# Patient Record
Sex: MALE | Race: WHITE | Employment: FULL TIME
[De-identification: names, ages, dates, MRNs, and addresses within clinical notes are randomized per-mention and may not be internally consistent; named-entity substitution may affect disease eponyms.]

---

## 2017-07-05 PROBLEM — L83 ACANTHOSIS NIGRICANS: Status: ACTIVE | Noted: 2017-07-05

## 2017-07-05 PROBLEM — E66.01 MORBID OBESITY WITH BMI OF 45.0-49.9, ADULT (HCC): Status: ACTIVE | Noted: 2017-07-05

## 2017-09-19 PROBLEM — K76.0 FATTY LIVER DISEASE, NONALCOHOLIC: Status: ACTIVE | Noted: 2017-09-19

## 2017-09-19 PROBLEM — E78.5 DYSLIPIDEMIA: Status: ACTIVE | Noted: 2017-09-19

## 2017-09-19 PROCEDURE — 80074 ACUTE HEPATITIS PANEL: CPT | Performed by: INTERNAL MEDICINE

## 2017-09-19 PROCEDURE — 82103 ALPHA-1-ANTITRYPSIN TOTAL: CPT | Performed by: INTERNAL MEDICINE

## 2017-09-19 PROCEDURE — 82390 ASSAY OF CERULOPLASMIN: CPT | Performed by: INTERNAL MEDICINE

## 2017-09-19 PROCEDURE — 83516 IMMUNOASSAY NONANTIBODY: CPT | Performed by: INTERNAL MEDICINE

## 2018-06-25 RX ORDER — ACETAMINOPHEN 500 MG
1000 TABLET ORAL ONCE
Status: CANCELLED | OUTPATIENT
Start: 2018-06-25 | End: 2018-06-25

## 2018-07-02 ENCOUNTER — SURGERY (OUTPATIENT)
Age: 28
End: 2018-07-02

## 2018-07-02 ENCOUNTER — ANESTHESIA (OUTPATIENT)
Dept: ENDOSCOPY | Facility: HOSPITAL | Age: 28
End: 2018-07-02

## 2018-07-02 ENCOUNTER — ANESTHESIA EVENT (OUTPATIENT)
Dept: ENDOSCOPY | Facility: HOSPITAL | Age: 28
End: 2018-07-02

## 2018-07-02 ENCOUNTER — HOSPITAL ENCOUNTER (OUTPATIENT)
Facility: HOSPITAL | Age: 28
Setting detail: HOSPITAL OUTPATIENT SURGERY
Discharge: HOME | End: 2018-07-02
Attending: INTERNAL MEDICINE | Admitting: INTERNAL MEDICINE
Payer: COMMERCIAL

## 2018-07-02 VITALS
SYSTOLIC BLOOD PRESSURE: 138 MMHG | BODY MASS INDEX: 42.66 KG/M2 | RESPIRATION RATE: 18 BRPM | OXYGEN SATURATION: 100 % | WEIGHT: 315 LBS | DIASTOLIC BLOOD PRESSURE: 82 MMHG | HEIGHT: 72 IN | TEMPERATURE: 98 F | HEART RATE: 79 BPM

## 2018-07-02 PROCEDURE — 0DB68ZX EXCISION OF STOMACH, VIA NATURAL OR ARTIFICIAL OPENING ENDOSCOPIC, DIAGNOSTIC: ICD-10-PCS | Performed by: INTERNAL MEDICINE

## 2018-07-02 PROCEDURE — 88305 TISSUE EXAM BY PATHOLOGIST: CPT | Performed by: INTERNAL MEDICINE

## 2018-07-02 RX ORDER — SODIUM CHLORIDE, SODIUM LACTATE, POTASSIUM CHLORIDE, CALCIUM CHLORIDE 600; 310; 30; 20 MG/100ML; MG/100ML; MG/100ML; MG/100ML
INJECTION, SOLUTION INTRAVENOUS CONTINUOUS
Status: DISCONTINUED | OUTPATIENT
Start: 2018-07-02 | End: 2018-07-02

## 2018-07-02 RX ORDER — HYDROMORPHONE HYDROCHLORIDE 1 MG/ML
INJECTION, SOLUTION INTRAMUSCULAR; INTRAVENOUS; SUBCUTANEOUS
Status: DISCONTINUED
Start: 2018-07-02 | End: 2018-07-02 | Stop reason: WASHOUT

## 2018-07-02 NOTE — ANESTHESIA PREPROCEDURE EVALUATION
PRE-OP EVALUATION    Patient Name: Noe Dyer    Pre-op Diagnosis: Body mass index 45.0-49.9, adult ([facility].) [Z68.42]    Procedure(s):  ESOPHAGOGASTRODUODENOSCOPY (EGD)    Surgeon(s) and Role:     Madonna Weir MD - Primary    Pre-op vitals reviewed normal.                 Other findings            ASA: 3   Plan: MAC  NPO status verified and           Plan/risks discussed with: patient                Present on Admission:  **None**

## 2018-07-02 NOTE — OPERATIVE REPORT
OPERATIVE REPORT   PATIENT NAME: Edi Davenport  MRN: [id number]  DATE OF OPERATION: 7/2/2018  PREOPERATIVE DIAGNOSIS:   1. Patient requires upper endoscopy as evaluation prior to bariatric surgery. POSTOPERATIVE DIAGNOSES:  1.  Sliding hiatal hernia Hill g abnormalities. Retroflexion revealed a normal cardia and fundus. The antrum was normal and the pylorus was patent. Random biopsies were taken from the antrum to rule out H. pylori gastritis  3.  Normal duodenum to the second portion with no ulcers or vitaliy

## 2018-07-02 NOTE — ANESTHESIA POSTPROCEDURE EVALUATION
[facility] Patient Status:  Hospital Outpatient Surgery   Age/Gender 34year old male MRN [id number]   [facility] ENDOSCOPY Attending Carlos Pratt MD   Hosp Day # 0 PCP Madonna Elkins MD       Anesthesia Post-o

## 2018-07-05 NOTE — PROGRESS NOTES
7/5/2018  [address] [id number]    Dear Rogelio Dennis,       Here are the biopsy/pathology findings from your recent EGD (Upper  Endoscopy):  1. Sliding hiatal hernia otherwise normal upper endoscopy  2.   Stomach biopsies u

## 2018-10-09 PROCEDURE — 84590 ASSAY OF VITAMIN A: CPT | Performed by: SURGERY

## 2018-10-09 PROCEDURE — 84207 ASSAY OF VITAMIN B-6: CPT | Performed by: SURGERY

## 2018-10-09 PROCEDURE — 84597 ASSAY OF VITAMIN K: CPT | Performed by: SURGERY

## 2018-10-09 PROCEDURE — 84425 ASSAY OF VITAMIN B-1: CPT | Performed by: SURGERY

## 2021-09-17 PROBLEM — G47.30 SLEEP APNEA: Status: ACTIVE | Noted: 2017-08-08

## (undated) DEVICE — Device: Brand: DEFENDO AIR/WATER/SUCTION AND BIOPSY VALVE

## (undated) DEVICE — FORCEP RADIAL JAW 4

## (undated) DEVICE — 3M™ RED DOT™ MONITORING ELECTRODE WITH FOAM TAPE AND STICKY GEL, 50/BAG, 20/CASE, 72/PLT 2570: Brand: RED DOT™

## (undated) DEVICE — 1200CC GUARDIAN II: Brand: GUARDIAN

## (undated) DEVICE — ENDOSCOPY PACK UPPER: Brand: MEDLINE INDUSTRIES, INC.

## (undated) DEVICE — FILTERLINE NASAL ADULT O2/CO2

## (undated) NOTE — LETTER
7/5/2018          [address] [id number]    Dear Lindsey Chicas,       Here are the biopsy/pathology findings from your recent EGD (Upper  Endoscopy):  1. Sliding hiatal hernia otherwise normal upper endoscopy  2.   Stomac